# Patient Record
Sex: FEMALE | Race: WHITE | NOT HISPANIC OR LATINO | Employment: UNEMPLOYED | URBAN - METROPOLITAN AREA
[De-identification: names, ages, dates, MRNs, and addresses within clinical notes are randomized per-mention and may not be internally consistent; named-entity substitution may affect disease eponyms.]

---

## 2018-01-10 ENCOUNTER — TRANSCRIBE ORDERS (OUTPATIENT)
Dept: ADMINISTRATIVE | Facility: HOSPITAL | Age: 48
End: 2018-01-10

## 2018-01-10 DIAGNOSIS — M50.30 DEGENERATION OF CERVICAL INTERVERTEBRAL DISC: ICD-10-CM

## 2018-01-10 DIAGNOSIS — M54.2 CERVICALGIA: Primary | ICD-10-CM

## 2018-01-15 ENCOUNTER — HOSPITAL ENCOUNTER (OUTPATIENT)
Dept: RADIOLOGY | Facility: HOSPITAL | Age: 48
Discharge: HOME | End: 2018-01-15
Attending: ORTHOPAEDIC SURGERY
Payer: COMMERCIAL

## 2018-01-15 ENCOUNTER — GENERIC CONVERSION - ENCOUNTER (OUTPATIENT)
Dept: OTHER | Facility: OTHER | Age: 48
End: 2018-01-15

## 2018-01-15 DIAGNOSIS — M50.30 DEGENERATION OF CERVICAL INTERVERTEBRAL DISC: ICD-10-CM

## 2018-01-15 DIAGNOSIS — M54.2 CERVICALGIA: ICD-10-CM

## 2018-01-15 PROCEDURE — 72141 MRI NECK SPINE W/O DYE: CPT

## 2022-07-11 ENCOUNTER — TELEPHONE (OUTPATIENT)
Dept: DERMATOLOGY | Facility: CLINIC | Age: 52
End: 2022-07-11

## 2022-07-11 ENCOUNTER — OFFICE VISIT (OUTPATIENT)
Dept: DERMATOLOGY | Age: 52
End: 2022-07-11
Payer: COMMERCIAL

## 2022-07-11 VITALS — WEIGHT: 261 LBS | HEIGHT: 66 IN | TEMPERATURE: 97.6 F | BODY MASS INDEX: 41.95 KG/M2

## 2022-07-11 DIAGNOSIS — L72.0 EPIDERMAL CYST: ICD-10-CM

## 2022-07-11 DIAGNOSIS — L57.0 LICHENOID KERATOSIS: ICD-10-CM

## 2022-07-11 DIAGNOSIS — L40.9 PSORIASIS: Primary | ICD-10-CM

## 2022-07-11 PROCEDURE — 99204 OFFICE O/P NEW MOD 45 MIN: CPT | Performed by: DERMATOLOGY

## 2022-07-11 RX ORDER — MOMETASONE FUROATE 1 MG/G
OINTMENT TOPICAL
Qty: 45 G | Refills: 3 | Status: SHIPPED | OUTPATIENT
Start: 2022-07-11 | End: 2022-08-15 | Stop reason: SDUPTHER

## 2022-07-11 RX ORDER — KETOCONAZOLE 20 MG/G
CREAM TOPICAL
Qty: 30 G | Refills: 2 | Status: SHIPPED | OUTPATIENT
Start: 2022-07-11 | End: 2022-08-15 | Stop reason: SDUPTHER

## 2022-07-11 NOTE — PROGRESS NOTES
[facility] Note     Patient Name: Aline Quintana  Encounter Date: 07/11/22     Have you been cared for by a [facility] in the last 3 years and, if so, which one? No    · Have you traveled outside of the [address] [place] [address] in the past 3 months or outside of the [facility] area in the last 2 weeks? No     May we call your Preferred Phone number to discuss your specific medical information? Yes     May we leave a detailed message that includes your specific medical information? Yes      Today's Chief Concerns:   Concern #1:  Skin tags      Past Medical History:  Have you personally ever had or currently have any of the following? · Skin cancer (such as Melanoma, Basal Cell Carcinoma, Squamous Cell Carcinoma? (If Yes, please provide more detail)- No  · Eczema: No  · Psoriasis: No  · HIV/AIDS: No  · Hepatitis B or C: No  · Tuberculosis: No  · Systemic Immunosuppression such as Diabetes, Biologic or Immunotherapy, Chemotherapy, Organ Transplantation, Bone Marrow Transplantation (If YES, please provide more detail): No  · Radiation Treatment (If YES, please provide more detail): No  · Any other major medical conditions/concerns? (If Yes, which types)- No    Social History:     What is/was your primary occupation?  What are your hobbies/past-times? Family History:  Have any of your "first degree relatives" (parent, brother, sister, or child) had any of the following       · Skin cancer such as Melanoma or Merkel Cell Carcinoma or Pancreatic Cancer? YES, father has hx of BCC  · Eczema, Asthma, Hay Fever or Seasonal Allergies: No  · Psoriasis or Psoriatic Arthritis: No  · Do any other medical conditions seem to run in your family? If Yes, what condition and which relatives? No    Current Medications:       No current outpatient medications on file  Review of Systems:  Have you recently had or currently have any of the following?   If YES, what are you doing for the problem? · Fever, chills or unintended weight loss: No  · Sudden loss or change in your vision: No  · Nausea, vomiting or blood in your stool: No  · Painful or swollen joints: No  · Wheezing or cough: No  · Changing mole or non-healing wound: No  · Nosebleeds: No  · Excessive sweating: No  · Easy or prolonged bleeding? No  · Over the last 2 weeks, how often have you been bothered by the following problems? · Taking little interest or pleasure in doing things: 1 - Not at All  · Feeling down, depressed, or hopeless: 1 - Not at All  · Rapid heartbeat with epinephrine:  No    · FEMALES ONLY:    · Are you pregnant or planning to become pregnant? No  · Are you currently or planning to be nursing or breast feeding? No    · Any known allergies? Allergies   Allergen Reactions    Levofloxacin Hives   ·       Physical Exam:     Was a chaperone (Derm Clinical Assistant) present throughout the entire Physical Exam? Yes     Did the Dermatology Team specifically  the patient on the importance of a Full Skin Exam to be sure that nothing is missed clinically?  Yes}  o Did the patient ultimately request or accept a Full Skin Exam?  NO  o Did the patient specifically refuse to have the areas "under-the-bra" examined by the Dermatologist? No  o Did the patient specifically refuse to have the areas "under-the-underwear" examined by the Dermatologist? No    CONSTITUTIONAL:   Vitals:    07/11/22 1436   Temp: 97 6 °F (36 4 °C)   TempSrc: Temporal   Weight: 118 kg (261 lb)   Height: 5' 6" (1 676 m)       PSYCH: Normal mood and affect  EYES: Normal conjunctiva  ENT: Normal lips and oral mucosa  CARDIOVASCULAR: No edema  RESPIRATORY: Normal respirations  HEME/LYMPH/IMMUNO:  No regional lymphadenopathy except as noted below in "ASSESSMENT AND PLAN BY DIAGNOSIS"    SKIN:  FOCUSED ORGAN SYSTEM EXAM  Hair, Scalp, Ears, Face Normal except as noted below in Assessment   Neck, Cervical Chain Nodes Normal except as noted below in Assessment   Right Arm/Hand/Fingers Normal except as noted below in Assessment   Left Arm/Hand/Fingers Normal except as noted below in Assessment   Chest/Axillae Viewed areas Normal except as noted below in Assessment   Abdomen, Umbilicus Normal except as noted below in Assessment   Back/Spine Normal except as noted below in Assessment        Assessment and Plan by Diagnosis:    History of Present Condition:     Duration:  How long has this been an issue for you?    o  about 1 year   Location Affected:  Where on the body is this affecting you?    o  under breast   Quality:  Is there any bleeding, pain, itch, burning/irritation, or redness associated with the skin lesion? o  irritation   Severity:  Describe any bleeding, pain, itch, burning/irritation, or redness on a scale of 1 to 10 (with 10 being the worst)  o  4   Timing:  Does this condition seem to be there pretty constantly or do you notice it more at specific times throughout the day? o  constant   Context:  Have you ever noticed that this condition seems to be associated with specific activities you do?    o  going to gym rubbing on clothes   Modifying Factors:    o Anything that seems to make the condition worse?    -  rubbing on clothes  o What have you tried to do to make the condition better?    -  denies   Associated Signs and Symptoms:  Does this skin lesion seem to be associated with any of the following:  o  SL AMB DERM SIGNS AND SYMPTOMS: Pus or Discharge       EPIDERMAL INCLUSION CYST    Physical Exam:   Anatomic Location Affected:  Under left breast   Morphological Description:  5 mm Cystic mobile nodule   Pertinent Positives:   Pertinent Negatives:     Additional History of Present Condition:  Had about 1 year    Assessment and Plan:  Based on a thorough discussion of this condition and the management approach to it (including a comprehensive discussion of the known risks, side effects and potential benefits of treatment), the patient (family) agrees to implement the following specific plan:   Can monitor   If gets bothersome can schedule appointment for surgically removed    What are epidermal inclusion cysts? Epidermal inclusion cysts are the most common, benign cutaneous cysts  There are many different names for epidermal inclusion cysts, including epidermoid cyst, epidermal cyst, infundibular cyst, inclusion cyst, and keratin cyst  These cysts can occur anywhere on the body and typically present as nodules directly underneath the skin  There is often a visible pore or opening in the center  The cysts are freely moveable and can range from a few millimeters to several centimeters in diameter  The center of epidermoid cysts almost always contains keratin, which has a cheesy appearance, and not sebum  They also do not originate from sebaceous glands  Therefore, epidermal inclusion cysts are not the same as sebaceous cysts  Cysts may remain stable or progressively enlarge over time  There are no reliable predictive factors to tell if an epidermal inclusion cyst will enlarge, become inflamed, or remain quiescent  Infected cysts tend to become larger, turn red, and are more noticeable to the patient  There may be accompanying pain and discomfort  What causes epidermal inclusion cysts? Epidermal inclusion cysts often appear out of the blue and are not contagious  They are due to a proliferation of epidermal cells within the dermis and are more common in men than women  They occur more frequently in patients in their 20s to 45s  Epidermal inclusion cysts by themselves are usually not inherited, but they can be hereditary in rare syndromes such as Norton syndrome, nodular elastosis with cysts and comedones (Favre-Racouchot syndrome), and basal cell nevus syndrome (Gorlin syndrome)  Elderly patients with chronic sun-damaged skin areas have a higher likelihood of developing epidermoid cysts   They often occur in areas where hair follicles have been inflamed or repeatedly irritated are more frequent in patients with acne vulgaris  In the  period, they are called milia  Patients on BRAF inhibitors such as imiquimod and cyclosporine have a higher incidence of epidermoid cysts of the face  How do we diagnose an epidermal inclusion cyst?  Epidermoid inclusion cysts are often diagnosed by history and physical exam  There is usually no need for biopsy prior to removal   Radiographic and laboratory exams, such as ultrasound studies, are unnecessary and not typically ordered unless the practitioner suspects a genetic condition  What is the treatment for an epidermal inclusion cyst?  Inflamed, uninfected epidermal inclusion cysts rarely resolve spontaneously without therapy or surgical intervention  Treatment is not emergent unless desired by the patient  Definitive treatment is via surgical excision with walls intact  This method will prevent recurrence  This is best done when the cyst is not inflamed, to decrease the probability of rupture during surgery  - A local anesthetic will be injected around the cyst  - A small incision is made in the skin overlying the cyst, and contents are expressed  - The incision is repaired with sutures    Another option is to use a 4mm punch biopsy with cyst extraction through the defect  Incision and drainage is often needed if the cyst is infected or inflamed  If there is surrounding cellulitis, oral antibiotic therapy may be necessary  The common agents used target methicillin sensitive Staphylococcal aureus and methicillin resistant S aureus in areas of high prevalence  PSORIASIS    Physical Exam:   Anatomic Location Affected:  elbows   Morphological Description:  Pink scaly plaques   Severity: mild   Body Percent Affected: 3%   Pertinent Positives:   Pertinent Negatives:     Additional History of Present Condition:  Mom has history of psorasis    Assessment and Plan:  Based on a thorough discussion of this condition and the management approach to it (including a comprehensive discussion of the known risks, side effects and potential benefits of treatment), the patient (family) agrees to implement the following specific plan:   Mometasone 0 1% ointment apply once daily to elbows   Ketoconazole 2 % cream nightly for face     Psoriasis is a chronic inflammatory condition that causes the body to make new skin cells in days rather than weeks  As these cells pile up on the surface of the skin, you may see thick, scaly patches of thickened skin  Psoriasis affects 2-4% of males and females  It can start at any age including childhood, with peaks of onset at 15-25 years and 50-60 years  It tends to persist lifelong, fluctuating in extent and severity  It is particularly common in Caucasians but may affect people of any race  About one-third of patients with psoriasis have family members with psoriasis  Psoriasis is multifactorial  It is classified as an immune-mediated inflammatory disease (IMID)  Genetic factors are important and influence the type of psoriasis and response to treatment  What are the signs and symptoms of psoriasis? There are many different types of psoriasis that each have present uniquely  The types of psoriasis include:    Plaque psoriasis: About 80% to 90% of people who have psoriasis develop this type  When plaque psoriasis appears, you may see:  Plaque psoriasis usually presents with symmetrically distributed, red, scaly plaques with well-defined edges  The scale is typically silvery white, except in skin folds where the plaques often appear shiny and they may have a moist peeling surface  The most common sites are scalp, elbows and knees, but any part of the skin can be involved  The plaques are usually very persistent without treatment    Itch is mostly mild but may be severe in some patients, leading to scratching and lichenification (thickened leathery skin with increased skin markings)  Painful skin cracks or fissures may occur  When psoriatic plaques clear up, they may leave brown or pale marks that can be expected to fade over several months  Guttate psoriasis: When someone gets this type of psoriasis, you often see tiny bumps appear on the skin quite suddenly  The bumps tend to cover much of the torso, legs, and arms  Sometimes, the bumps also develop on the face, scalp, and ears  No matter where they appear, the bumps tend to be:    Small and scaly   [place]-colored to pink   Temporary, clearing in a few weeks or months without treatment  When guttate psoriasis clears, it may never return  Why this happens is still a bit of a mystery  Guttate psoriasis tends to develop in children and young adults who've had an infection, such as strep throat  It's possible that when the infection clears so does guttate psoriasis  It's also possible to have:   Guttate psoriasis for life   See the guttate psoriasis clear and plaque psoriasis develop later in life   Plaque psoriasis when you develop guttate psoriasis  There's no way to predict what will happen after the first flare-up of guttate psoriasis clears  Inverse psoriasis: This type of psoriasis develops in areas where skin touches skin, such as the armpits, genitals, and crease of the buttocks  Where the inverse psoriasis appears, you're likely to notice:   Smooth, red patches of skin that look raw   Little, if any, silvery-white coating   Sore or painful skin  Other names for this type of psoriasis are intertriginous psoriasis or flexural psoriasis  Pustular psoriasis: This type of psoriasis causes pus-filled bumps that usually appear only on the feet and hands  While the pus-filled bumps may look like an infection, the skin is not infected  The bumps don't contain bacteria or anything else that could cause an infection    Where pustular psoriasis appears, you tend to notice:   Red, swollen skin that is dotted with pus-filled bumps   Extremely sore or painful skin   Brown dots (and sometimes scale) appear as the pus-filled bumps dry  Pustular psoriasis can make just about any activity that requires your hands or feet, such as typing or walking, unbearably painful  Pustular psoriasis (generalized): Serious and life-threatening, this rare type of psoriasis causes pus-filled bumps to develop on much of the skin  Also called von Zumbusch psoriasis, a flare-up causes this sequence of events:  1  Skin on most of the body suddenly turns dry, red, and tender  2  Within hours, pus-filled bumps cover most of the skin  3  Often within a day, the pus-filled bumps break open and pools of pus leak onto the skin  4  As the pus dries (usually within 24 to 48 hours), the skin dries out and peels (as shown in this picture)  5  When the dried skin peels off, you see a smooth, glazed surface  6  In a few days or weeks, you may see a new crop of pus-filled bumps covering most of the skin, as the cycle repeats itself  Anyone with pustular psoriasis also feels very sick, and may develop a fever, headache, muscle weakness, and other symptoms  Medical care is often necessary to save the person's life  Erythrodermic psoriasis: Serious and life-threatening, this type of psoriasis requires immediate medical care  When someone develops erythrodermic psoriasis, you may notice:   Skin on most of the body looks burnt   Chills, fever, and the person looks extremely ill   Muscle weakness, a rapid pulse, and severe itch  The person may also be unable to keep warm, so hypothermia can set in quickly  Most people who develop this type of psoriasis already have another type of psoriasis  Before developing erythrodermic psoriasis, they often notice that their psoriasis is worsening or not improving with treatment   If you notice either of these happening, see a board-certified dermatologist     Nails    Nail psoriasis: With any type of psoriasis, you may see changes to your fingernails or toenails  About half of the people who have plaque psoriasis see signs of psoriasis on their fingernails at some point2  When psoriasis affects the nails, you may notice:   Tiny dents in your nails (called nail pits)   White, yellow, or brown discoloration under one or more nails   Crumbling, rough nails   A nail lifting up so that it's no longer attached   Buildup of skin cells beneath one or more nails, which lifts up the nail  Treatment and proper nail care can help you control nail psoriasis  Psoriatic arthritis: If you have psoriasis, it's important to pay attention to your joints  Some people who have psoriasis develop a type of arthritis called psoriatic arthritis  This is more likely to occur if you have severe psoriasis  Most people notice psoriasis on their skin years before they develop psoriatic arthritis  It's also possible to get psoriatic arthritis before psoriasis, but this is less common  When psoriatic arthritis develops, the signs can be subtle  At first, you may notice:   A swollen and tender joint, especially in a finger or toe   Heel pain   Swelling on the back of your leg, just above your heel   Stiffness in the morning that fades during the day  Like psoriasis, psoriatic arthritis cannot be cured  Treatment can prevent psoriatic arthritis from worsening, which is important  Allowed to progress, psoriatic arthritis can become disabling  Diagnosis and treatment of psoriasis   Psoriasis is usually diagnosed by clinical features, and skin biopsy if necessary  It is important to decrease factors that aggravate psoriasis  These include treating streptococcal infections, minimizing skin injuries, avoiding sun exposure if it exacerbates psoriasis, smoking, alcohol usage, decreasing stress, and maintaining an optimal body weight   Certain medications such as lithium, beta blockers, antimalarials, and NSAIDs have also been implicated  Suddenly stopping oral steroids or strong topical steroids can cause rebound disease  There are many categories of psoriasis treatments available  Topical therapy  Mild psoriasis is generally treated with topical agents alone  Which treatment is selected may depend on body site, extent and severity of psoriasis   Emollients   Coal tar preparations   Dithranol   Salicylic acid   Vitamin D analogue (calcipotriol)   Topical corticosteroids   Calcineurin inhibitor (tacrolimus, pimecrolimus)  Phototherapy  Most psoriasis centres offer phototherapy with ultraviolet (UV) radiation, often in combination with topical or systemic agents  Types of phototherapy include   Narrowband UVB   Broadband UVB   Photochemotherapy (PUVA)   Targeted phototherapy  Systemic therapy  Moderate to severe psoriasis warrants treatment with a systemic agent and/or phototherapy  The most common treatments are:   Methotrexate   Ciclosporin   Acitretin  Other medicines occasionally used for psoriasis include:   Mycophenolate   Apremilast   Hydroxyurea   Azathioprine   6-mercaptopurine  Systemic corticosteroids are best avoided due to a risk of severe withdrawal flare of psoriasis and adverse effects  Biologics or targeted therapies are reserved for conventional treatment-resistant severe psoriasis, mainly because of expense, as side effects compare favorably with other systemic agents  These include:   Anti-tumour necrosis factor-alpha antagonists (anti-TNF?) infliximab, adalimumab and etanercept   The interleukin (IL)-12/23 antagonist ustekinumab   IL-17 antagonists such as secukinumab  Many other monoclonal antibodies are under investigation in the treatment of psoriasis      LICHENOID KERATOSIS ("BENIGN LICHENOID KERATOSIS")    Physical Exam:   Anatomic Location Affected:  RIGHT UPPER ARM   Morphological Description:  Tan- pink flat topped papule with slight scale    Pertinent Positives:   Pertinent Negatives: Additional History of Present Condition:  Present for years    Assessment and Plan:  Based on a thorough discussion of this condition and the management approach to it (including a comprehensive discussion of the known risks, side effects and potential benefits of treatment), the patient (family) agrees to implement the following specific plan:   Reassured benign    What is lichenoid keratosis? Lichenoid keratosis is usually a small, solitary, inflamed macule or thin pigmented plaque  Multiple eruptive lichenoid keratoses in sun-exposed sites are also described  Their colour varies from an initial reddish brown to a greyish purple/brown as the lesion resolves several weeks or months later  Lichenoid keratosis is also known as benign lichenoid keratosis, solitary lichen planus, lichen planus-like keratosis and involuting lichenoid plaque  They are one of the causes of atypical solar lentigo  Who gets lichenoid keratosis? Lichenoid keratosis generally develops in fair-skinned patients aged 27-80 years  It is twice as common in females as than males  It is most commonly seen in Caucasians and rarely affects Asians,  Americans or Hispanics  What causes lichenoid keratosis? Lichenoid keratosis is an inflammatory reaction arising in a regressing existing solar lentigo or seborrhoeic keratosis  It is not known what triggers the reaction, but triggers include minor trauma such as friction, drugs, dermatitis, and sun exposure  How is lichenoid keratosis diagnosed? Lichenoid keratosis is diagnosed by its clinical and dermoscopic appearance, which reveals uniform clusters of grey dots and, depending on the stage of the lesion, may show signs of an original pre-existing lentigo or seborrhoeic keratosis  In time, signs of the original lesion disappear   Later on the grey dots also disappear, as the lesion resolves to reveal normal skin   Because clinical examination and dermatoscopy may not be able to differentiate between lichenoid keratosis and other solitary erythematous lesions that could be melanocytic, non-melanocytic benign, malignant or inflammatory, a punch or shave skin biopsy may be necessary  Histopathology of resembles that of lichen planus or lichenoid drug eruption, with some slight differences  Remnants of the original solar lentigo or seborrhoeic keratosis may be evident  What are the clinical features of lichenoid keratosis? Classic, bullous, or atypical   Acute rapidly developing lesion (present for <3 months)   Erythematous or pinkish papule or plaque   Dermoscopy may show remnants of pigment network, subtle blotches of brown colour, clusters of grey dots plus dotted, irregular linear and other shaped telangiectatic blood vessels  Early or interface subtype   Subacute lesions present for 3 months to one year   Erythematous to dusky-red or hyper-pigmented brown lesion   Depending on the age of lesion, dermoscopy may show features of a solar lentigo or flat seborrhoeic keratosis with moth-eaten borders, fingerprinting, milia-like cysts, comedo-like openings, plus small foci of melanophages (grey dots)  Late regressed or atrophic subtype   Lesions have been present for more than one year   May be violaceous (violet-coloured) papules or irregularly distributed lesions with shades of brown or grey  Other features of lichenoid keratosis are:   A solitary lesion is present in 87% of cases of lichenoid keratosis, with other patients presenting with few to many lesions   It is most commonly found on the upper trunk, followed by the distal upper extremities, and less commonly on the head and neck   Size ranges from a few millimetres to one centimetre or more in size   The skin surface may be smooth, scaly or warty     The lesion is often symptomless or it may be itchy or have a mild stinging sensation  What is the management of lichenoid keratosis? Lichenoid keratosis is harmless and resolves spontaneously  If there is any doubt about the diagnosis, dermatoscopic digital images can be taken and used in follow-up a few months later  Lichenoid keratosis can be removed if desired by liquid nitrogen, electrosurgery or curettage  Multiple eruptive lichenoid keratoses may be effectively treated with the oral retinoid, acitretin  To date there have been no reports of lichenoid keratosis turning into malignant skin tumours        Scribe Attestation    I,:  Ana Lilia Briggs am acting as a scribe while in the presence of the attending physician :       I,:  Rayne Mccarthy MD personally performed the services described in this documentation    as scribed in my presence :

## 2022-07-11 NOTE — TELEPHONE ENCOUNTER
Pt called saying someone tried reaching out but no vms was left she wasn't sure why she was called  Pt said she will have her PCP fax over Referral gave Pt central fax number

## 2022-07-11 NOTE — PATIENT INSTRUCTIONS
EPIDERMAL INCLUSION CYST      Assessment and Plan:  Based on a thorough discussion of this condition and the management approach to it (including a comprehensive discussion of the known risks, side effects and potential benefits of treatment), the patient (family) agrees to implement the following specific plan:  Can monitor  If gets bothersome can schedule appointment for surgically removed    What are epidermal inclusion cysts? Epidermal inclusion cysts are the most common, benign cutaneous cysts  There are many different names for epidermal inclusion cysts, including epidermoid cyst, epidermal cyst, infundibular cyst, inclusion cyst, and keratin cyst  These cysts can occur anywhere on the body and typically present as nodules directly underneath the skin  There is often a visible pore or opening in the center  The cysts are freely moveable and can range from a few millimeters to several centimeters in diameter  The center of epidermoid cysts almost always contains keratin, which has a cheesy appearance, and not sebum  They also do not originate from sebaceous glands  Therefore, epidermal inclusion cysts are not the same as sebaceous cysts  Cysts may remain stable or progressively enlarge over time  There are no reliable predictive factors to tell if an epidermal inclusion cyst will enlarge, become inflamed, or remain quiescent  Infected cysts tend to become larger, turn red, and are more noticeable to the patient  There may be accompanying pain and discomfort  What causes epidermal inclusion cysts? Epidermal inclusion cysts often appear out of the blue and are not contagious  They are due to a proliferation of epidermal cells within the dermis and are more common in men than women  They occur more frequently in patients in their 20s to 45s   Epidermal inclusion cysts by themselves are usually not inherited, but they can be hereditary in rare syndromes such as Norton syndrome, nodular elastosis with cysts and comedones (Favre-Racouchot syndrome), and basal cell nevus syndrome (Gorlin syndrome)  Elderly patients with chronic sun-damaged skin areas have a higher likelihood of developing epidermoid cysts  They often occur in areas where hair follicles have been inflamed or repeatedly irritated are more frequent in patients with acne vulgaris  In the  period, they are called milia  Patients on BRAF inhibitors such as imiquimod and cyclosporine have a higher incidence of epidermoid cysts of the face  How do we diagnose an epidermal inclusion cyst?  Epidermoid inclusion cysts are often diagnosed by history and physical exam  There is usually no need for biopsy prior to removal   Radiographic and laboratory exams, such as ultrasound studies, are unnecessary and not typically ordered unless the practitioner suspects a genetic condition  What is the treatment for an epidermal inclusion cyst?  Inflamed, uninfected epidermal inclusion cysts rarely resolve spontaneously without therapy or surgical intervention  Treatment is not emergent unless desired by the patient  Definitive treatment is via surgical excision with walls intact  This method will prevent recurrence  This is best done when the cyst is not inflamed, to decrease the probability of rupture during surgery  A local anesthetic will be injected around the cyst  A small incision is made in the skin overlying the cyst, and contents are expressed  The incision is repaired with sutures    Another option is to use a 4mm punch biopsy with cyst extraction through the defect  Incision and drainage is often needed if the cyst is infected or inflamed  If there is surrounding cellulitis, oral antibiotic therapy may be necessary  The common agents used target methicillin sensitive Staphylococcal aureus and methicillin resistant S aureus in areas of high prevalence       PSORIASIS    Assessment and Plan:  Based on a thorough discussion of this condition and the management approach to it (including a comprehensive discussion of the known risks, side effects and potential benefits of treatment), the patient (family) agrees to implement the following specific plan:  Mometasone 0 1% ointment apply once daily to elbows  Ketoconazole 2 % cream nightly for face     Psoriasis is a chronic inflammatory condition that causes the body to make new skin cells in days rather than weeks  As these cells pile up on the surface of the skin, you may see thick, scaly patches of thickened skin  Psoriasis affects 2-4% of males and females  It can start at any age including childhood, with peaks of onset at 15-25 years and 50-60 years  It tends to persist lifelong, fluctuating in extent and severity  It is particularly common in Caucasians but may affect people of any race  About one-third of patients with psoriasis have family members with psoriasis  Psoriasis is multifactorial  It is classified as an immune-mediated inflammatory disease (IMID)  Genetic factors are important and influence the type of psoriasis and response to treatment  What are the signs and symptoms of psoriasis? There are many different types of psoriasis that each have present uniquely  The types of psoriasis include:    Plaque psoriasis: About 80% to 90% of people who have psoriasis develop this type  When plaque psoriasis appears, you may see:  Plaque psoriasis usually presents with symmetrically distributed, red, scaly plaques with well-defined edges  The scale is typically silvery white, except in skin folds where the plaques often appear shiny and they may have a moist peeling surface  The most common sites are scalp, elbows and knees, but any part of the skin can be involved  The plaques are usually very persistent without treatment  Itch is mostly mild but may be severe in some patients, leading to scratching and lichenification (thickened leathery skin with increased skin markings)   Painful skin cracks or fissures may occur  When psoriatic plaques clear up, they may leave brown or pale marks that can be expected to fade over several months  Guttate psoriasis: When someone gets this type of psoriasis, you often see tiny bumps appear on the skin quite suddenly  The bumps tend to cover much of the torso, legs, and arms  Sometimes, the bumps also develop on the face, scalp, and ears  No matter where they appear, the bumps tend to be:   Small and scaly  Levelock-colored to pink  Temporary, clearing in a few weeks or months without treatment  When guttate psoriasis clears, it may never return  Why this happens is still a bit of a mystery  Guttate psoriasis tends to develop in children and young adults who've had an infection, such as strep throat  It's possible that when the infection clears so does guttate psoriasis  It's also possible to have:  Guttate psoriasis for life  See the guttate psoriasis clear and plaque psoriasis develop later in life  Plaque psoriasis when you develop guttate psoriasis  There's no way to predict what will happen after the first flare-up of guttate psoriasis clears  Inverse psoriasis: This type of psoriasis develops in areas where skin touches skin, such as the armpits, genitals, and crease of the buttocks  Where the inverse psoriasis appears, you're likely to notice:  Smooth, red patches of skin that look raw  Little, if any, silvery-white coating  Sore or painful skin  Other names for this type of psoriasis are intertriginous psoriasis or flexural psoriasis  Pustular psoriasis: This type of psoriasis causes pus-filled bumps that usually appear only on the feet and hands  While the pus-filled bumps may look like an infection, the skin is not infected  The bumps don't contain bacteria or anything else that could cause an infection    Where pustular psoriasis appears, you tend to notice:  Red, swollen skin that is dotted with pus-filled bumps  Extremely sore or painful skin  Brown dots (and sometimes scale) appear as the pus-filled bumps dry  Pustular psoriasis can make just about any activity that requires your hands or feet, such as typing or walking, unbearably painful  Pustular psoriasis (generalized): Serious and life-threatening, this rare type of psoriasis causes pus-filled bumps to develop on much of the skin  Also called von Zumbusch psoriasis, a flare-up causes this sequence of events:  Skin on most of the body suddenly turns dry, red, and tender  Within hours, pus-filled bumps cover most of the skin  Often within a day, the pus-filled bumps break open and pools of pus leak onto the skin  As the pus dries (usually within 24 to 48 hours), the skin dries out and peels (as shown in this picture)  When the dried skin peels off, you see a smooth, glazed surface  In a few days or weeks, you may see a new crop of pus-filled bumps covering most of the skin, as the cycle repeats itself  Anyone with pustular psoriasis also feels very sick, and may develop a fever, headache, muscle weakness, and other symptoms  Medical care is often necessary to save the person's life  Erythrodermic psoriasis: Serious and life-threatening, this type of psoriasis requires immediate medical care  When someone develops erythrodermic psoriasis, you may notice:  Skin on most of the body looks burnt  Chills, fever, and the person looks extremely ill  Muscle weakness, a rapid pulse, and severe itch  The person may also be unable to keep warm, so hypothermia can set in quickly  Most people who develop this type of psoriasis already have another type of psoriasis  Before developing erythrodermic psoriasis, they often notice that their psoriasis is worsening or not improving with treatment  If you notice either of these happening, see a board-certified dermatologist     Nails    Nail psoriasis: With any type of psoriasis, you may see changes to your fingernails or toenails   About half of the people who have plaque psoriasis see signs of psoriasis on their fingernails at some point2  When psoriasis affects the nails, you may notice:  Tiny dents in your nails (called nail pits)  White, yellow, or brown discoloration under one or more nails  Crumbling, rough nails  A nail lifting up so that it's no longer attached  Buildup of skin cells beneath one or more nails, which lifts up the nail  Treatment and proper nail care can help you control nail psoriasis  Psoriatic arthritis: If you have psoriasis, it's important to pay attention to your joints  Some people who have psoriasis develop a type of arthritis called psoriatic arthritis  This is more likely to occur if you have severe psoriasis  Most people notice psoriasis on their skin years before they develop psoriatic arthritis  It's also possible to get psoriatic arthritis before psoriasis, but this is less common  When psoriatic arthritis develops, the signs can be subtle  At first, you may notice:  A swollen and tender joint, especially in a finger or toe  Heel pain  Swelling on the back of your leg, just above your heel  Stiffness in the morning that fades during the day  Like psoriasis, psoriatic arthritis cannot be cured  Treatment can prevent psoriatic arthritis from worsening, which is important  Allowed to progress, psoriatic arthritis can become disabling  Diagnosis and treatment of psoriasis   Psoriasis is usually diagnosed by clinical features, and skin biopsy if necessary  It is important to decrease factors that aggravate psoriasis  These include treating streptococcal infections, minimizing skin injuries, avoiding sun exposure if it exacerbates psoriasis, smoking, alcohol usage, decreasing stress, and maintaining an optimal body weight  Certain medications such as lithium, beta blockers, antimalarials, and NSAIDs have also been implicated  Suddenly stopping oral steroids or strong topical steroids can cause rebound disease  There are many categories of psoriasis treatments available  Topical therapy  Mild psoriasis is generally treated with topical agents alone  Which treatment is selected may depend on body site, extent and severity of psoriasis  Emollients  Coal tar preparations  Dithranol  Salicylic acid  Vitamin D analogue (calcipotriol)  Topical corticosteroids  Calcineurin inhibitor (tacrolimus, pimecrolimus)  Phototherapy  Most psoriasis centres offer phototherapy with ultraviolet (UV) radiation, often in combination with topical or systemic agents  Types of phototherapy include  Narrowband UVB  Broadband UVB  Photochemotherapy (PUVA)  Targeted phototherapy  Systemic therapy  Moderate to severe psoriasis warrants treatment with a systemic agent and/or phototherapy  The most common treatments are:  Methotrexate  Ciclosporin  Acitretin  Other medicines occasionally used for psoriasis include:  Mycophenolate  Apremilast  Hydroxyurea  Azathioprine  6-mercaptopurine  Systemic corticosteroids are best avoided due to a risk of severe withdrawal flare of psoriasis and adverse effects  Biologics or targeted therapies are reserved for conventional treatment-resistant severe psoriasis, mainly because of expense, as side effects compare favorably with other systemic agents  These include:  Anti-tumour necrosis factor-alpha antagonists (anti-TNF?) infliximab, adalimumab and etanercept  The interleukin (IL)-12/23 antagonist ustekinumab  IL-17 antagonists such as secukinumab  Many other monoclonal antibodies are under investigation in the treatment of psoriasis      LICHENOID KERATOSIS ("BENIGN LICHENOID KERATOSIS")    Assessment and Plan:  Based on a thorough discussion of this condition and the management approach to it (including a comprehensive discussion of the known risks, side effects and potential benefits of treatment), the patient (family) agrees to implement the following specific plan:  Reassured benign    What is lichenoid keratosis? Lichenoid keratosis is usually a small, solitary, inflamed macule or thin pigmented plaque  Multiple eruptive lichenoid keratoses in sun-exposed sites are also described  Their colour varies from an initial reddish brown to a greyish purple/brown as the lesion resolves several weeks or months later  Lichenoid keratosis is also known as benign lichenoid keratosis, solitary lichen planus, lichen planus-like keratosis and involuting lichenoid plaque  They are one of the causes of atypical solar lentigo  Who gets lichenoid keratosis? Lichenoid keratosis generally develops in fair-skinned patients aged 27-80 years  It is twice as common in females as than males  It is most commonly seen in Caucasians and rarely affects Asians,  Americans or Hispanics  What causes lichenoid keratosis? Lichenoid keratosis is an inflammatory reaction arising in a regressing existing solar lentigo or seborrhoeic keratosis  It is not known what triggers the reaction, but triggers include minor trauma such as friction, drugs, dermatitis, and sun exposure  How is lichenoid keratosis diagnosed? Lichenoid keratosis is diagnosed by its clinical and dermoscopic appearance, which reveals uniform clusters of grey dots and, depending on the stage of the lesion, may show signs of an original pre-existing lentigo or seborrhoeic keratosis  In time, signs of the original lesion disappear  Later on the grey dots also disappear, as the lesion resolves to reveal normal skin  Because clinical examination and dermatoscopy may not be able to differentiate between lichenoid keratosis and other solitary erythematous lesions that could be melanocytic, non-melanocytic benign, malignant or inflammatory, a punch or shave skin biopsy may be necessary  Histopathology of resembles that of lichen planus or lichenoid drug eruption, with some slight differences   Remnants of the original solar lentigo or seborrhoeic keratosis may be evident  What are the clinical features of lichenoid keratosis? Classic, bullous, or atypical  Acute rapidly developing lesion (present for <3 months)  Erythematous or pinkish papule or plaque  Dermoscopy may show remnants of pigment network, subtle blotches of brown colour, clusters of grey dots plus dotted, irregular linear and other shaped telangiectatic blood vessels  Early or interface subtype  Subacute lesions present for 3 months to one year  Erythematous to dusky-red or hyper-pigmented brown lesion  Depending on the age of lesion, dermoscopy may show features of a solar lentigo or flat seborrhoeic keratosis with moth-eaten borders, fingerprinting, milia-like cysts, comedo-like openings, plus small foci of melanophages (grey dots)  Late regressed or atrophic subtype  Lesions have been present for more than one year  May be violaceous (violet-coloured) papules or irregularly distributed lesions with shades of brown or grey  Other features of lichenoid keratosis are:  A solitary lesion is present in 58% of cases of lichenoid keratosis, with other patients presenting with few to many lesions  It is most commonly found on the upper trunk, followed by the distal upper extremities, and less commonly on the head and neck  Size ranges from a few millimetres to one centimetre or more in size  The skin surface may be smooth, scaly or warty  The lesion is often symptomless or it may be itchy or have a mild stinging sensation  What is the management of lichenoid keratosis? Lichenoid keratosis is harmless and resolves spontaneously  If there is any doubt about the diagnosis, dermatoscopic digital images can be taken and used in follow-up a few months later  Lichenoid keratosis can be removed if desired by liquid nitrogen, electrosurgery or curettage  Multiple eruptive lichenoid keratoses may be effectively treated with the oral retinoid, acitretin    To date there have been no reports of lichenoid keratosis turning into malignant skin tumours

## 2022-08-15 DIAGNOSIS — L40.9 PSORIASIS: ICD-10-CM

## 2022-08-15 NOTE — TELEPHONE ENCOUNTER
[facility] is calling as pt brought prescription to pharmacy, however, it is not on the order pad and they will not accept it  They are requesting electronic submission for the prescriptions from 7/11/22  Thank you! Hi, it's [facility] pharmacy  We're calling about our patient, Belle Gardner's date of birth is [id number] She brought in prescriptions on a hard copy, but they're not on the [place] blanks, the blue ones  Unfortunately, we do need that  So if you could please send them an electronically, that would be great  This is [facility] at [phone] in [place] if you have any questions  Thank you

## 2022-08-17 RX ORDER — MOMETASONE FUROATE 1 MG/G
OINTMENT TOPICAL
Qty: 45 G | Refills: 3 | Status: SHIPPED | OUTPATIENT
Start: 2022-08-17

## 2022-08-17 RX ORDER — KETOCONAZOLE 20 MG/G
CREAM TOPICAL
Qty: 30 G | Refills: 2 | Status: SHIPPED | OUTPATIENT
Start: 2022-08-17

## 2023-08-16 ENCOUNTER — APPOINTMENT (OUTPATIENT)
Dept: RADIOLOGY | Facility: CLINIC | Age: 53
End: 2023-08-16
Payer: COMMERCIAL

## 2023-08-16 PROCEDURE — 71046 X-RAY EXAM CHEST 2 VIEWS: CPT

## 2025-07-07 ENCOUNTER — OFFICE VISIT (OUTPATIENT)
Age: 55
End: 2025-07-07
Payer: MEDICARE

## 2025-07-07 VITALS — HEIGHT: 66 IN | WEIGHT: 252.4 LBS | TEMPERATURE: 97.2 F | BODY MASS INDEX: 40.56 KG/M2

## 2025-07-07 DIAGNOSIS — L81.4 SOLAR LENTIGO: ICD-10-CM

## 2025-07-07 DIAGNOSIS — D18.01 CHERRY ANGIOMA: ICD-10-CM

## 2025-07-07 DIAGNOSIS — L66.9 SCARRING ALOPECIA: Primary | ICD-10-CM

## 2025-07-07 DIAGNOSIS — D22.70 MULTIPLE BENIGN MELANOCYTIC NEVI OF UPPER EXTREMITY, LOWER EXTREMITY, AND TRUNK: ICD-10-CM

## 2025-07-07 DIAGNOSIS — D22.5 MULTIPLE BENIGN MELANOCYTIC NEVI OF UPPER EXTREMITY, LOWER EXTREMITY, AND TRUNK: ICD-10-CM

## 2025-07-07 DIAGNOSIS — L66.9 SCARRING ALOPECIA: ICD-10-CM

## 2025-07-07 DIAGNOSIS — L40.9 PSORIASIS: ICD-10-CM

## 2025-07-07 DIAGNOSIS — D22.60 MULTIPLE BENIGN MELANOCYTIC NEVI OF UPPER EXTREMITY, LOWER EXTREMITY, AND TRUNK: ICD-10-CM

## 2025-07-07 PROCEDURE — 99214 OFFICE O/P EST MOD 30 MIN: CPT | Performed by: DERMATOLOGY

## 2025-07-07 RX ORDER — DOXYCYCLINE 100 MG/1
100 CAPSULE ORAL EVERY 12 HOURS SCHEDULED
Qty: 180 CAPSULE | Refills: 1 | Status: SHIPPED | OUTPATIENT
Start: 2025-07-07 | End: 2026-01-03

## 2025-07-07 RX ORDER — SPIRONOLACTONE 50 MG/1
50 TABLET, FILM COATED ORAL DAILY
Qty: 90 TABLET | Refills: 1 | Status: SHIPPED | OUTPATIENT
Start: 2025-07-07 | End: 2025-10-05

## 2025-07-07 RX ORDER — MONTELUKAST SODIUM 10 MG/1
10 TABLET ORAL EVERY EVENING
COMMUNITY

## 2025-07-07 RX ORDER — CELECOXIB 200 MG/1
CAPSULE ORAL DAILY PRN
COMMUNITY

## 2025-07-07 RX ORDER — OMEPRAZOLE 40 MG/1
40 CAPSULE, DELAYED RELEASE ORAL DAILY
COMMUNITY
Start: 2025-04-13

## 2025-07-07 RX ORDER — LEVOCETIRIZINE DIHYDROCHLORIDE 5 MG/1
5 TABLET, FILM COATED ORAL EVERY EVENING
COMMUNITY
Start: 2025-06-11

## 2025-07-07 RX ORDER — PAROXETINE 40 MG/1
40 TABLET, FILM COATED ORAL DAILY
COMMUNITY

## 2025-07-07 RX ORDER — DOXAZOSIN 4 MG/1
1 TABLET ORAL DAILY
COMMUNITY
Start: 2025-04-14

## 2025-07-07 RX ORDER — ALBUTEROL SULFATE 0.83 MG/ML
2.5 SOLUTION RESPIRATORY (INHALATION)
COMMUNITY

## 2025-07-07 RX ORDER — MOMETASONE FUROATE 1 MG/G
OINTMENT TOPICAL
Qty: 45 G | Refills: 3 | Status: SHIPPED | OUTPATIENT
Start: 2025-07-07

## 2025-07-07 RX ORDER — CLOBETASOL PROPIONATE 0.5 MG/ML
SOLUTION TOPICAL
Qty: 50 ML | Refills: 5 | Status: SHIPPED | OUTPATIENT
Start: 2025-07-07 | End: 2025-07-08

## 2025-07-07 RX ORDER — FLUTICASONE PROPIONATE AND SALMETEROL 500; 50 UG/1; UG/1
POWDER RESPIRATORY (INHALATION)
COMMUNITY

## 2025-07-07 RX ORDER — GABAPENTIN 300 MG/1
CAPSULE ORAL
COMMUNITY

## 2025-07-07 RX ORDER — VALSARTAN 320 MG/1
1 TABLET ORAL DAILY
COMMUNITY
Start: 2025-04-09

## 2025-07-07 NOTE — PATIENT INSTRUCTIONS
MELANOCYTIC NEVI  -Relevant exam: Scattered over the trunk/extremities are homogenously pigmented brown macules and papules. ELM performed and without concerning findings.  - Exam and clinical history consistent with melanocytic nevi  - Educated on the ABCDE's of melanoma; handout provided  - Counseled to return to clinic prior to scheduled appointment should any of these lesions change or should any new lesions of concern arise  - Counseled on use of sun protection daily. Reviewed latest FDA sunscreen guidelines, including use of broad spectrum (UVA and UVB blocking) sunscreen or sun protective clothing with SPF 30-50 every 2-3 hours and reapplied after exposure to water; use of photoprotective clothing, including a broad brim hat and UPF rated clothing if outdoors for several hours; avoid use of tanning beds as these pose significant risk for melanoma and skin cancer.    LENTIGINES  OTHER SKIN CHANGES DUE TO CHRONIC EXPOSURE TO NONIONIZING RADIATION  - Relevant exam: Over sun exposed areas are brown macules. ELM performed and without concerning findings.  - Exam and clinical history consistent with lentigines.  - Educated that these are indicative of prior sun exposure.   - Counseled to return to clinic prior to scheduled appointment should any of these lesions change or should any new lesions of concern arise.  - Recommended use of sunscreen as above and below.  - Counseled on use of sun protection daily. Reviewed latest FDA sunscreen guidelines, including use of broad spectrum (UVA and UVB blocking) sunscreen or sun protective clothing with SPF 30-50 every 2-3 hours and reapplied after exposure to water; use of photoprotective clothing, including a broad brim hat and UPF rated clothing if outdoors for several hours; avoid use of tanning beds as these pose significant risk for melanoma and skin cancer.    CHERRY ANGIOMAS  - Relevant exam: Scattered over the trunk/extremities are red papules  - Exam and clinical  history consistent with cherry angiomas  - Educated that these are benign  - Educated that removal is considered aesthetic and would incur a fee.  - Patient does not wish to pursue removal at this time but will contact us should this change.    SCARRING ALOPECIA    Assessment and Plan:  Based on a thorough discussion of this condition and the management approach to it (including a comprehensive discussion of the known risks, side effects and potential benefits of treatment), the patient (family) agrees to implement the following specific plan:  Discussion treatment options:  Topical Steroid  Oral Steroid  Oral or topical Minoxidil   Male hormone Blocker: Spironolactone  Start Doxycycline 100 mg take 1 tablet by mouth 2 times daily for 6 months.  Clobetasol 0.05% apply topically to the frontal scalp daily at bedtime.  Spironolactone 50 mg take 1 tablet by mouth daily. Please keep track of your blood pressure if too low will consult PCP.    What is scarring alopecia?     Scarring alopecia, also known as cicatricial alopecia, occurs when the hair follicle is the target of a destructive inflammatory process. Cicatricial alopecia occurs in otherwise healthy men and women of all ages and is seen worldwide.     Primary scarring alopecia is due to a diverse group of rare disorders that destroy the hair follicle and replace it with scar tissue causing permanent hair loss. Secondary scarring alopecias are due to destruction of the hair follicle due to external injury such as severe infections, burns, radiation, traction (tight curls), surgery, and other processes.   Infections causing scarring alopecia include:  Bacterial infection: boils and abscesses (Staphylococcus aureus)  Fungal infection: kerion (inflammatory tinea capitis)  Viral infection: shingles (herpes zoster).  Inflammatory skin diseases causing scarring alopecia include:  Folliculitis decalvans  Dissecting cellulitis  Lichen planopilaris  Frontal fibrosing  alopecia  Alopecia mucinosa  Discoid lupus erythematosus  Localised scleroderma.  Central Centrifugal Cicatricial Alopecia (CCCA) is a form of scarring alopecia on the scalp that results in permanent hair loss. It is the most common form of scarring hair loss seen in black women. However, it may be seen in men and among persons of all races and hair colour (though rarely). Middle-aged women are most commonly affected. The exact cause of CCCA is unknown and is likely multifactorial. Hair care practices, such as the use of the hot comb, relaxers, tight extensions and weaves, have been implicated for decades, but studies have not shown a consistent link. Other proposed causes are similar to the ones discussed above.     What are the signs and symptoms of scarring alopecia?     Hair loss can be gradual, without symptoms, and unnoticed for long periods. In other cases, the hair loss may be associated with severe itching, pain and burning, and progress rapidly.     Affected areas of the scalp may have redness, scaling, increased or decreased pigmentation, pustules, or draining sinuses. Other cases may show little signs of inflammation. The inflammation that destroys the follicle is below the skin surface and there is usually no “scar” seen on the scalp, although the affected scalp is usually left bare and smooth without hair and usual pore markings.    How do we diagnose scarring alopecia?     Early diagnosis of scarring alopecia is important because medical intervention can prevent further progression that often results in extensive, permanent hair loss. Diagnosis is based on clinical features, scalp biopsy and exclusion of other hair loss disorders.    Your doctor may perform a scalp biopsy by removing a piece of skin taken from an active edge of a patch of alopecia. Examination of the tissue under microscopy reveals inflammatory cells around the infundibulum (base of the hair follicle), and fibrosis (scarring).  Premature peeling of the inner root sheath of the hair follicle is a common finding.     How do we treat scarring alopecia?     The goal of therapy is to halt progression of disease and prevent further hair loss. In areas where the hair follicle has been replaced with scarring, regrowth is not possible. Treatment depends on the underlying cause of the inflammatory process.  Infections should be treated.  Deficiencies should be remedied.  Causative drugs may be discontinued.  Inflammation can be suppressed.  Treatment may be available for specific conditions.  Treatment options for Central Centrifugal Cicatricial Alopecia include anti-inflammatory agents such as:  Potent topical steroids (eg clobetasol) or intralesional steroids  Calcineurin inhibitors: tacrolimus ointment, pimecrolimus cream  Tetracyclines (eg doxycycline 100 mg twice daily, taken for several weeks to months)  Hydroxychloroquine  Ciclosporin.    Hair transplantation can be considered in individuals with well-controlled CCCA for at least one year. However, graft survival is low.    Discontinuation of traumatic hair care practices is an essential aspect of treatment of CCCA.    Women with CCCA are encouraged to consider natural hairstyles.  Relaxers should be performed by a professional, no more frequently than every 6-8 weeks. The scalp should not burn as a result of relaxer application.  Minimize heat application (hooded dryers, blow dryers, hot balderas and flat irons)  Avoid tight jose juan and weaves/extensions   Avoid hair style practices associated with discomfort, scalp irritation or scale  In Black women, shampooing every 1-2 weeks may prevent excessive dryness    Minoxidil solution may help stimulate growth in hair follicles. Seborrheic dermatitis should be treated with appropriate medicated shampoos and topical anti-inflammatory agents as needed.     PSORIASIS    Assessment and Plan:  Based on a thorough discussion of this condition and the  management approach to it (including a comprehensive discussion of the known risks, side effects and potential benefits of treatment), the patient (family) agrees to implement the following specific plan:  Continue Mometasone 0.1% ointment apply to the right elbow daily.     Psoriasis is a chronic inflammatory condition that causes the body to make new skin cells in days rather than weeks. As these cells pile up on the surface of the skin, you may see thick, scaly patches of thickened skin.   Psoriasis affects 2-4% of males and females. It can start at any age including childhood, with peaks of onset at 15-25 years and 50-60 years. It tends to persist lifelong, fluctuating in extent and severity. It is particularly common in Caucasians but may affect people of any race. About one-third of patients with psoriasis have family members with psoriasis.  Psoriasis is multifactorial. It is classified as an immune-mediated inflammatory disease (IMID). Genetic factors are important and influence the type of psoriasis and response to treatment.

## 2025-07-07 NOTE — PROGRESS NOTES
"[facility] Dermatology Clinic Note     Patient Name: Kendra Jordan  Encounter Date: 7/7/25       Have you been cared for by a [facility] Dermatologist in the last 3 years and, if so, which description applies to you? Yes. I have been here within the last 3 years, and my medical history has NOT changed since that time. I am of child-bearing potential.     REVIEW OF SYSTEMS:  Have you recently had or currently have any of the following? No changes in my recent health.   PAST MEDICAL HISTORY:  Have you personally ever had or currently have any of the following?  If \"YES,\" then please provide more detail. No changes in my medical history.   HISTORY OF IMMUNOSUPPRESSION: Do you have a history of any of the following:  Systemic Immunosuppression such as Diabetes, Biologic or Immunotherapy, Chemotherapy, Organ Transplantation, Bone Marrow Transplantation or Prednisone?  No     Answering \"YES\" requires the addition of the dotphrase \"IMMUNOSUPPRESSED\" as the first diagnosis of the patient's visit.   FAMILY HISTORY:  Any \"first degree relatives\" (parent, brother, sister, or child) with the following?    No changes in my family's known health.   PATIENT EXPERIENCE:    Do you want the Dermatologist to perform a COMPLETE skin exam today including a clinical examination under the \"bra and underwear\" areas?  Yes  If necessary, do we have your permission to call and leave a detailed message on your Preferred Phone number that includes your specific medical information?  Yes      Allergies[1] Current Medications[2]              Whom besides the patient is providing clinical information about today's encounter?   NO ADDITIONAL HISTORIAN (patient alone provided history)    Physical Exam and Assessment/Plan by Diagnosis:      MELANOCYTIC NEVI  -Relevant exam: Scattered over the trunk/extremities are homogenously pigmented brown macules and papules. ELM performed and without concerning findings.  - Exam and clinical history " consistent with melanocytic nevi  - Educated on the ABCDE's of melanoma; handout provided  - Counseled to return to clinic prior to scheduled appointment should any of these lesions change or should any new lesions of concern arise  - Counseled on use of sun protection daily. Reviewed latest FDA sunscreen guidelines, including use of broad spectrum (UVA and UVB blocking) sunscreen or sun protective clothing with SPF 30-50 every 2-3 hours and reapplied after exposure to water; use of photoprotective clothing, including a broad brim hat and UPF rated clothing if outdoors for several hours; avoid use of tanning beds as these pose significant risk for melanoma and skin cancer.    LENTIGINES  OTHER SKIN CHANGES DUE TO CHRONIC EXPOSURE TO NONIONIZING RADIATION  - Relevant exam: Over sun exposed areas are brown macules. ELM performed and without concerning findings.  - Exam and clinical history consistent with lentigines.  - Educated that these are indicative of prior sun exposure.   - Counseled to return to clinic prior to scheduled appointment should any of these lesions change or should any new lesions of concern arise.  - Recommended use of sunscreen as above and below.  - Counseled on use of sun protection daily. Reviewed latest FDA sunscreen guidelines, including use of broad spectrum (UVA and UVB blocking) sunscreen or sun protective clothing with SPF 30-50 every 2-3 hours and reapplied after exposure to water; use of photoprotective clothing, including a broad brim hat and UPF rated clothing if outdoors for several hours; avoid use of tanning beds as these pose significant risk for melanoma and skin cancer.    CHERRY ANGIOMAS  - Relevant exam: Scattered over the trunk/extremities are red papules  - Exam and clinical history consistent with cherry angiomas  - Educated that these are benign  - Educated that removal is considered aesthetic and would incur a fee.  - Patient does not wish to pursue removal at this time  but will contact us should this change.    SCARRING ALOPECIA    Physical Exam:  Anatomic Location Affected:  Frontal Scalp  Morphological Description:  thinning and recession of the frontal hair line with lonely hairs  Pertinent Positives:  Pertinent Negatives:    Additional History of Present Condition:  started around 6 months ago, uses bar soaps    Assessment and Plan:  Based on a thorough discussion of this condition and the management approach to it (including a comprehensive discussion of the known risks, side effects and potential benefits of treatment), the patient (family) agrees to implement the following specific plan:  Discussion treatment options:  Topical Steroid  Oral Steroid  Oral or topical Minoxidil   Male hormone Blocker: Spironolactone  Start Doxycycline 100 mg take 1 tablet by mouth 2 times daily for 6 months.  Clobetasol 0.05% apply topically to the frontal scalp daily at bedtime.  Spironolactone 50 mg take 1 tablet by mouth daily. Please keep track of your blood pressure if too low will consult PCP.  Consider topical minoxidil - ie over the counter rogaine  Once you have completed menopause we can switch from spironolactone to dutasteride    What is scarring alopecia?     Scarring alopecia, also known as cicatricial alopecia, occurs when the hair follicle is the target of a destructive inflammatory process. Cicatricial alopecia occurs in otherwise healthy men and women of all ages and is seen worldwide.     Primary scarring alopecia is due to a diverse group of rare disorders that destroy the hair follicle and replace it with scar tissue causing permanent hair loss. Secondary scarring alopecias are due to destruction of the hair follicle due to external injury such as severe infections, burns, radiation, traction (tight curls), surgery, and other processes.   Infections causing scarring alopecia include:  Bacterial infection: boils and abscesses (Staphylococcus aureus)  Fungal infection:  kerion (inflammatory tinea capitis)  Viral infection: shingles (herpes zoster).  Inflammatory skin diseases causing scarring alopecia include:  Folliculitis decalvans  Dissecting cellulitis  Lichen planopilaris  Frontal fibrosing alopecia  Alopecia mucinosa  Discoid lupus erythematosus  Localised scleroderma.  Central Centrifugal Cicatricial Alopecia (CCCA) is a form of scarring alopecia on the scalp that results in permanent hair loss. It is the most common form of scarring hair loss seen in black women. However, it may be seen in men and among persons of all races and hair colour (though rarely). Middle-aged women are most commonly affected. The exact cause of CCCA is unknown and is likely multifactorial. Hair care practices, such as the use of the hot comb, relaxers, tight extensions and weaves, have been implicated for decades, but studies have not shown a consistent link. Other proposed causes are similar to the ones discussed above.     What are the signs and symptoms of scarring alopecia?     Hair loss can be gradual, without symptoms, and unnoticed for long periods. In other cases, the hair loss may be associated with severe itching, pain and burning, and progress rapidly.     Affected areas of the scalp may have redness, scaling, increased or decreased pigmentation, pustules, or draining sinuses. Other cases may show little signs of inflammation. The inflammation that destroys the follicle is below the skin surface and there is usually no “scar” seen on the scalp, although the affected scalp is usually left bare and smooth without hair and usual pore markings.    How do we diagnose scarring alopecia?     Early diagnosis of scarring alopecia is important because medical intervention can prevent further progression that often results in extensive, permanent hair loss. Diagnosis is based on clinical features, scalp biopsy and exclusion of other hair loss disorders.    Your doctor may perform a scalp biopsy by  removing a piece of skin taken from an active edge of a patch of alopecia. Examination of the tissue under microscopy reveals inflammatory cells around the infundibulum (base of the hair follicle), and fibrosis (scarring). Premature peeling of the inner root sheath of the hair follicle is a common finding.     How do we treat scarring alopecia?     The goal of therapy is to halt progression of disease and prevent further hair loss. In areas where the hair follicle has been replaced with scarring, regrowth is not possible. Treatment depends on the underlying cause of the inflammatory process.  Infections should be treated.  Deficiencies should be remedied.  Causative drugs may be discontinued.  Inflammation can be suppressed.  Treatment may be available for specific conditions.  Treatment options for Central Centrifugal Cicatricial Alopecia include anti-inflammatory agents such as:  Potent topical steroids (eg clobetasol) or intralesional steroids  Calcineurin inhibitors: tacrolimus ointment, pimecrolimus cream  Tetracyclines (eg doxycycline 100 mg twice daily, taken for several weeks to months)  Hydroxychloroquine  Ciclosporin.    Hair transplantation can be considered in individuals with well-controlled CCCA for at least one year. However, graft survival is low.    Discontinuation of traumatic hair care practices is an essential aspect of treatment of CCCA.    Women with CCCA are encouraged to consider natural hairstyles.  Relaxers should be performed by a professional, no more frequently than every 6-8 weeks. The scalp should not burn as a result of relaxer application.  Minimize heat application (hooded dryers, blow dryers, hot balderas and flat irons)  Avoid tight jose juan and weaves/extensions   Avoid hair style practices associated with discomfort, scalp irritation or scale  In Black women, shampooing every 1-2 weeks may prevent excessive dryness    Minoxidil solution may help stimulate growth in hair follicles.  Seborrheic dermatitis should be treated with appropriate medicated shampoos and topical anti-inflammatory agents as needed.     PSORIASIS    Physical Exam:  Anatomic Location Affected:  Right elbow  Morphological Description:  red scaly plaque  Severity: mild  Body Percent Affected: 1%  Pertinent Positives:  Pertinent Negatives:    Additional History of Present Condition:  last evaluated 7/11/2022 was prescribed mometasone 0.1% oint. which is helping    Assessment and Plan:  Based on a thorough discussion of this condition and the management approach to it (including a comprehensive discussion of the known risks, side effects and potential benefits of treatment), the patient (family) agrees to implement the following specific plan:  Continue Mometasone 0.1% ointment apply to the right elbow daily.     Psoriasis is a chronic inflammatory condition that causes the body to make new skin cells in days rather than weeks. As these cells pile up on the surface of the skin, you may see thick, scaly patches of thickened skin.   Psoriasis affects 2-4% of males and females. It can start at any age including childhood, with peaks of onset at 15-25 years and 50-60 years. It tends to persist lifelong, fluctuating in extent and severity. It is particularly common in Caucasians but may affect people of any race. About one-third of patients with psoriasis have family members with psoriasis.  Psoriasis is multifactorial. It is classified as an immune-mediated inflammatory disease (IMID). Genetic factors are important and influence the type of psoriasis and response to treatment.       Scribe Attestation    I,:  Katerin Ramirez MA am acting as a scribe while in the presence of the attending physician.:       I,:  Yoli Cash MD personally performed the services described in this documentation    as scribed in my presence.:                   [1]  Allergies  Allergen Reactions   • Amoxicillin-Pot Clavulanate GI  Intolerance   • Levofloxacin Hives   [2]    Current Outpatient Medications:   •  albuterol (2.5 mg/3 mL) 0.083 % nebulizer solution, Take 2.5 mg by nebulization solution for nebulization, Disp: , Rfl:   •  celecoxib (CeleBREX) 200 mg capsule, Take by mouth daily as needed, Disp: , Rfl:   •  clobetasol (TEMOVATE) 0.05 % external solution, Apply topically daily at bedtime To the frontal scalp for 6 months, Disp: 50 mL, Rfl: 5  •  doxazosin (CARDURA) 4 mg tablet, Take 1 tablet by mouth in the morning, Disp: , Rfl:   •  doxycycline hyclate (VIBRAMYCIN) 100 mg capsule, Take 1 capsule (100 mg total) by mouth every 12 (twelve) hours, Disp: 180 capsule, Rfl: 1  •  Fluticasone-Salmeterol (Advair) 500-50 mcg/dose inhaler, Advair Diskus 500 mcg-50 mcg/dose powder for inhalation  USE 1 INHALATION BY MOUTH TWICE DAILY, Disp: , Rfl:   •  gabapentin (NEURONTIN) 300 mg capsule, TAKE 1 CAPSULE BY MOUTH 4 TIMES A DAY AS DIRECTED, Disp: , Rfl:   •  levocetirizine (XYZAL) 5 MG tablet, Take 5 mg by mouth every evening, Disp: , Rfl:   •  mometasone (ELOCON) 0.1 % ointment, Apply topically daily to elbows, Disp: 45 g, Rfl: 3  •  montelukast (SINGULAIR) 10 mg tablet, Take 10 mg by mouth every evening, Disp: , Rfl:   •  omeprazole (PriLOSEC) 40 MG capsule, Take 40 mg by mouth daily Take before a meal, Disp: , Rfl:   •  PARoxetine (PAXIL) 40 MG tablet, Take 40 mg by mouth daily As directed, Disp: , Rfl:   •  spironolactone (ALDACTONE) 50 mg tablet, Take 1 tablet (50 mg total) by mouth daily, Disp: 90 tablet, Rfl: 1  •  valsartan (DIOVAN) 320 MG tablet, Take 1 tablet by mouth in the morning, Disp: , Rfl:   •  ketoconazole (NIZORAL) 2 % cream, Apply nightly to face (Patient not taking: Reported on 7/7/2025), Disp: 30 g, Rfl: 2

## 2025-07-08 ENCOUNTER — TELEPHONE (OUTPATIENT)
Age: 55
End: 2025-07-08

## 2025-07-08 DIAGNOSIS — L66.9 SCARRING ALOPECIA: Primary | ICD-10-CM

## 2025-07-08 RX ORDER — FLUOCINONIDE TOPICAL SOLUTION USP, 0.05% 0.5 MG/ML
SOLUTION TOPICAL
Qty: 60 ML | Refills: 5 | Status: SHIPPED | OUTPATIENT
Start: 2025-07-08

## 2025-07-08 NOTE — TELEPHONE ENCOUNTER
Patient called rx refill line stating her insurance does not cover newly prescribed Clobetasol (Temovate) Solution, inquiring on an alternative to be sent to [facility] pharmacy #[id number].

## 2025-07-14 RX ORDER — BETAMETHASONE DIPROPIONATE 0.5 MG/ML
LOTION, AUGMENTED TOPICAL 2 TIMES DAILY
Qty: 50 ML | Refills: 1 | Status: SHIPPED | OUTPATIENT
Start: 2025-07-14 | End: 2025-07-14 | Stop reason: SDUPTHER

## 2025-07-14 RX ORDER — BETAMETHASONE DIPROPIONATE 0.5 MG/ML
LOTION, AUGMENTED TOPICAL 2 TIMES DAILY
Qty: 50 ML | Refills: 1 | Status: SHIPPED | OUTPATIENT
Start: 2025-07-14